# Patient Record
Sex: FEMALE | Race: WHITE
[De-identification: names, ages, dates, MRNs, and addresses within clinical notes are randomized per-mention and may not be internally consistent; named-entity substitution may affect disease eponyms.]

---

## 2023-05-04 ENCOUNTER — HOSPITAL ENCOUNTER (EMERGENCY)
Dept: HOSPITAL 26 - MED | Age: 53
Discharge: HOME | End: 2023-05-04
Payer: SELF-PAY

## 2023-05-04 VITALS — HEIGHT: 62 IN | BODY MASS INDEX: 41.41 KG/M2 | WEIGHT: 225 LBS

## 2023-05-04 VITALS — SYSTOLIC BLOOD PRESSURE: 180 MMHG | DIASTOLIC BLOOD PRESSURE: 89 MMHG

## 2023-05-04 VITALS — SYSTOLIC BLOOD PRESSURE: 129 MMHG | DIASTOLIC BLOOD PRESSURE: 74 MMHG

## 2023-05-04 DIAGNOSIS — T59.893A: Primary | ICD-10-CM

## 2023-05-04 DIAGNOSIS — Z79.899: ICD-10-CM
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
## 2023-05-04 DIAGNOSIS — H10.213: ICD-10-CM

## 2023-05-04 NOTE — NUR
Patient refused to sign discharge paperwork, but patient accepted and received 
aftercare instructions and prescription. Patient discharged with v/s stable. 
Written and verbal after care instructions given and explained. Patient alert, 
oriented and verbalized understanding of instructions. Ambulatory with steady 
gait. All questions addressed prior to discharge. ID band removed. Patient 
advised to follow up with PMD. Patient educated on indication of medication 
including possible reaction and side effects. Opportunity to ask questions 
provided and answered.

Patient given refused offer of shelter placement.  Given list of available 
shelters in surrounding areas.